# Patient Record
Sex: MALE | Race: WHITE | NOT HISPANIC OR LATINO | URBAN - METROPOLITAN AREA
[De-identification: names, ages, dates, MRNs, and addresses within clinical notes are randomized per-mention and may not be internally consistent; named-entity substitution may affect disease eponyms.]

---

## 2021-05-16 ENCOUNTER — EMERGENCY (EMERGENCY)
Facility: HOSPITAL | Age: 51
LOS: 0 days | Discharge: HOME | End: 2021-05-16
Attending: EMERGENCY MEDICINE | Admitting: EMERGENCY MEDICINE
Payer: OTHER MISCELLANEOUS

## 2021-05-16 VITALS
SYSTOLIC BLOOD PRESSURE: 151 MMHG | RESPIRATION RATE: 18 BRPM | OXYGEN SATURATION: 97 % | DIASTOLIC BLOOD PRESSURE: 83 MMHG | TEMPERATURE: 98 F | HEART RATE: 86 BPM

## 2021-05-16 DIAGNOSIS — M25.562 PAIN IN LEFT KNEE: ICD-10-CM
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
## 2021-05-16 DIAGNOSIS — S39.012A STRAIN OF MUSCLE, FASCIA AND TENDON OF LOWER BACK, INITIAL ENCOUNTER: ICD-10-CM

## 2021-05-16 DIAGNOSIS — S89.92XA UNSPECIFIED INJURY OF LEFT LOWER LEG, INITIAL ENCOUNTER: ICD-10-CM

## 2021-05-16 DIAGNOSIS — M25.511 PAIN IN RIGHT SHOULDER: ICD-10-CM

## 2021-05-16 DIAGNOSIS — S49.91XA UNSPECIFIED INJURY OF RIGHT SHOULDER AND UPPER ARM, INITIAL ENCOUNTER: ICD-10-CM
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
## 2021-05-16 DIAGNOSIS — M54.5 LOW BACK PAIN: ICD-10-CM

## 2021-05-16 PROCEDURE — 73562 X-RAY EXAM OF KNEE 3: CPT | Mod: 26,LT

## 2021-05-16 PROCEDURE — 73030 X-RAY EXAM OF SHOULDER: CPT | Mod: 26,RT

## 2021-05-16 PROCEDURE — 99284 EMERGENCY DEPT VISIT MOD MDM: CPT

## 2021-05-16 RX ORDER — IBUPROFEN 200 MG
600 TABLET ORAL ONCE
Refills: 0 | Status: COMPLETED | OUTPATIENT
Start: 2021-05-16 | End: 2021-05-16

## 2021-05-16 RX ADMIN — Medication 600 MILLIGRAM(S): at 13:35

## 2021-05-16 NOTE — ED PROVIDER NOTE - CLINICAL SUMMARY MEDICAL DECISION MAKING FREE TEXT BOX
xr neg, ace applied to L knee, crutches provided, dc home f/u ortho 1-2 weeks, strict return precautions provided
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
## 2021-05-16 NOTE — ED PROVIDER NOTE - NSFOLLOWUPINSTRUCTIONS_ED_ALL_ED_FT
Back Pain    Back pain is very common in adults. The cause of back pain is rarely dangerous and the pain often gets better over time. The cause of your back pain may not be known and may include strain of muscles or ligaments, degeneration of the spinal disks, or arthritis. Occasionally the pain may radiate down your leg(s). Over-the-counter medicines to reduce pain and inflammation are often the most helpful. Stretching and remaining active frequently helps the healing process.     SEEK IMMEDIATE MEDICAL CARE IF YOU HAVE ANY OF THE FOLLOWING SYMPTOMS: bowel or bladder control problems, unusual weakness or numbness in your arms or legs, nausea or vomiting, abdominal pain, fever, dizziness/lightheadedness.    Knee Pain    WHAT YOU NEED TO KNOW:    What do I need to know about knee pain? Knee pain may start suddenly, or it may be a long-term problem. You may have pain on the side, front, or back of your knee. You may have knee stiffness and swelling. You may hear popping sounds or feel like your knee is giving way or locking up as you walk. You may feel pain when you sit, stand, walk, or climb up and down stairs.    What increases my risk for knee pain?     Obesity      A strain or tear in ligaments or tendons      A leg fracture or knee dislocation      Overuse of your knee      Osteoarthritis, rheumatoid arthritis, or gout      An infection, tumor, or cyst in your knee      Shoes that are not supportive, or training on a hard surface      Sports that involve jumping or pivoting on your knee    How is the cause of knee pain diagnosed? Your healthcare provider will examine your knee and ask about your symptoms. Tell your provider when the pain started and what you were doing at the time. Describe the pain, such as sharp, throbbing, or achy. Tell your provider about any knee injury or surgery you had. You may need any of the following:     MRI, CT, or ultrasound pictures may show an injury, fracture, or tumor.       Blood tests may be used to check the level of inflammation in your blood. The tests may also show signs of infection.      Arthroscopy is a procedure to look inside your knee joint with an arthroscope. An arthroscope is a flexible tube with a light and camera on the end. A knee arthroscopy is usually done to check for disease or damage inside your knee. These problems may be fixed during the procedure.    How is knee pain treated? Treatment will depend on the cause of your pain. You may need any of the following:     NSAIDs help decrease swelling and pain or fever. This medicine is available with or without a doctor's order. NSAIDs can cause stomach bleeding or kidney problems in certain people. If you take blood thinner medicine, always ask your healthcare provider if NSAIDs are safe for you. Always read the medicine label and follow directions.      Acetaminophen decreases pain and fever. It is available without a doctor's order. Ask how much to take and how often to take it. Follow directions. Read the labels of all other medicines you are using to see if they also contain acetaminophen, or ask your doctor or pharmacist. Acetaminophen can cause liver damage if not taken correctly. Do not use more than 4 grams (4,000 milligrams) total of acetaminophen in one day.       Prescription pain medicine may be given. Ask your healthcare provider how to take this medicine safely. Some prescription pain medicines contain acetaminophen. Do not take other medicines that contain acetaminophen without talking to your healthcare provider. Too much acetaminophen may cause liver damage. Prescription pain medicine may cause constipation. Ask your healthcare provider how to prevent or treat constipation.       Steroid injections may be given into your knee. Steroids reduce inflammation and pain.      Surgery may be used for some injuries, such as to repair a torn ACL.    What can I do to manage my symptoms?     Rest your knee so it can heal. Limit activities that increase your pain. Do low-impact exercises, such as walking or swimming.       Apply ice to help reduce swelling and pain. Use an ice pack, or put crushed ice in a plastic bag. Cover it with a towel before you apply it to your knee. Apply ice for 15 to 20 minutes every hour, or as directed.      Apply compression to help reduce swelling. Use a brace or bandage only as directed.      Elevate your knee to help decrease pain and swelling. Elevate your knee while you are sitting or lying down. Prop your leg on pillows to keep your knee above the level of your heart.      Prevent your knee from moving as directed. Your healthcare provider may put on a cast or splint. You may need to wear a leg brace to stabilize your knee. A leg brace can be adjusted to increase your range of motion as your knee heals.Hinged Knee Braces          What can I do to prevent knee pain?     Maintain a healthy weight. Extra weight increases your risk for knee pain. Ask your healthcare provider how much you should weigh. He or she can help you create a safe weight loss plan if you need to lose weight.      Exercise or train properly. Use the correct equipment for sports. Wear shoes that provide good support. Check your posture often as you exercise, play sports, or train for an event. This can help prevent stress and strain on your knees. Rest between sessions so you do not overwork your knees.    When should I seek immediate care?     Your pain is worse, even after treatment.       You cannot bend or straighten your leg completely.       The swelling around your knee does not go down even with treatment.      Your knee is painful and hot to the touch.     When should I contact my healthcare provider?     You have questions or concerns about your condition or care.         CARE AGREEMENT:    You have the right to help plan your care. Learn about your health condition and how it may be treated. Discuss treatment options with your healthcare providers to decide what care you want to receive. You always have the right to refuse treatment.     Shoulder Pain    Many things can cause shoulder pain, including:    An injury to the area.  Overuse of the shoulder.  Arthritis.    The source of the pain can be:    Inflammation.  An injury to the shoulder joint.  An injury to a tendon, ligament, or bone.    HOME CARE INSTRUCTIONS  Take these actions to help with your pain:     Squeeze a soft ball or a foam pad as much as possible. This helps to keep the shoulder from swelling. It also helps to strengthen the arm.  Take over-the-counter and prescription medicines only as told by your health care provider.  If directed, apply ice to the area:  Put ice in a plastic bag.  Place a towel between your skin and the bag.  Leave the ice on for 20 minutes, 2–3 times per day. Stop applying ice if it does not help with the pain.  If you were given a shoulder sling or immobilizer:  Wear it as told.  Remove it to shower or bathe.  Move your arm as little as possible, but keep your hand moving to prevent swelling.    SEEK MEDICAL CARE IF:  Your pain gets worse.  Your pain is not relieved with medicines.  New pain develops in your arm, hand, or fingers.    SEEK IMMEDIATE MEDICAL CARE IF:  Your arm, hand, or fingers:  Tingle.  Become numb.  Become swollen.  Become painful.  Turn white or blue.    ADDITIONAL NOTES AND INSTRUCTIONS    Please follow up with your Primary MD in 24-48 hr.  Seek immediate medical care for any new/worsening signs or symptoms.

## 2021-05-16 NOTE — ED PROVIDER NOTE - CARE PLAN
Principal Discharge DX:	Knee injury, left, initial encounter  Secondary Diagnosis:	Shoulder injury, right, initial encounter  Secondary Diagnosis:	Back strain, initial encounter

## 2021-05-16 NOTE — ED ADULT NURSE NOTE - CAS EDP DISCH TYPE
[Dear  ___] : Dear  [unfilled], [Courtesy Letter:] : I had the pleasure of seeing your patient, [unfilled], in my office today. [Please see my note below.] : Please see my note below. [Referral Closing:] : Thank you very much for seeing this patient.  If you have any questions, please do not hesitate to contact me. [Sincerely,] : Sincerely, [FreeTextEntry3] : .sig Home

## 2021-05-16 NOTE — ED PROVIDER NOTE - PATIENT PORTAL LINK FT
You can access the FollowMyHealth Patient Portal offered by [facility] by registering at the following website: [email or web address]. By joining [facility]’s FollowMyHealth portal, you will also be able to view your health information using other applications (apps) compatible with our system.

## 2023-01-10 PROBLEM — Z78.9 OTHER SPECIFIED HEALTH STATUS: Chronic | Status: ACTIVE | Noted: 2021-05-16

## 2023-01-18 PROBLEM — Z00.00 ENCOUNTER FOR PREVENTIVE HEALTH EXAMINATION: Status: ACTIVE | Noted: 2023-01-18

## 2023-01-30 ENCOUNTER — APPOINTMENT (OUTPATIENT)
Dept: ORTHOPEDIC SURGERY | Facility: CLINIC | Age: 53
End: 2023-01-30

## 2023-02-03 ENCOUNTER — APPOINTMENT (OUTPATIENT)
Dept: ORTHOPEDIC SURGERY | Facility: CLINIC | Age: 53
End: 2023-02-03
Payer: COMMERCIAL

## 2023-02-03 VITALS — HEIGHT: 70 IN

## 2023-02-03 PROCEDURE — 73560 X-RAY EXAM OF KNEE 1 OR 2: CPT | Mod: LT

## 2023-02-03 PROCEDURE — 99203 OFFICE O/P NEW LOW 30 MIN: CPT

## 2023-02-04 NOTE — ASSESSMENT
[FreeTextEntry1] :  at this time patient is retired  [place]  with significant symptoms and  and limits in function\par  relating to his left knee injury and his cervical /lumbar spine\par  based on his orthopedic history, current symptoms,  physical findings and diagnostic test results it is my opinion that he is totally disabled and unable to work we discussed  the possibility in the intermediate future of requiring a left knee replacement that he should maintain his weight light exercise unfortunately could not tolerate anti-inflammatories but we could do some injections\par \par With regard to his cervical lumbar spine if any other diagnostics are  performed to provide a copy I will see him in a few months

## 2023-02-04 NOTE — IMAGING
[de-identified] :  pleasant knees were examined in no distress moderately overweight\par \par Cervical spine good position mild spasm limits in flexion extension rotation\par \par Both shoulders good motion no focal neurologic deficits lumbar spine spasm limited flexion extension some pain with extension to the lower lumbar tightness in dorsal lumbar fascia and hamstrings hip motion is full negative straight leg bilaterally\par \par Left knee limited flexion extension some crepitus mild varus some swelling tenderness on the medial side no atrophy negative Homans sign\par \par X-rays obtained today of the knee show moderate to marked arthritic changes metallic retained hardware consistent with anchors and buttons from his previous ACL repairs\par \par MRI left knee 05/27/2021: Meniscal degeneration status post ACL reconstruction questioning insufficiency tricompartment chondromalacia with early arthritis and Baker cyst\par \par \par X-rays cervical and lumbar spine 01/24/2023: Mild degenerative changes 5 6 and 6 7 in the cervical spine mild degenerative disc disease at L4-5 and L5-S1
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
## 2023-04-05 ENCOUNTER — APPOINTMENT (OUTPATIENT)
Dept: ORTHOPEDIC SURGERY | Facility: CLINIC | Age: 53
End: 2023-04-05
Payer: COMMERCIAL

## 2023-04-05 VITALS — WEIGHT: 215 LBS | HEIGHT: 70 IN | BODY MASS INDEX: 30.78 KG/M2

## 2023-04-05 PROCEDURE — 99213 OFFICE O/P EST LOW 20 MIN: CPT

## 2023-04-05 NOTE — ASSESSMENT
[FreeTextEntry1] :  at this time patient is retired  [place]  with significant symptoms and  and limits in function\par  relating to his left knee injury and his cervical /lumbar spine\par  based on his orthopedic history, current symptoms,  physical findings and diagnostic test results it is my opinion that he is totally disabled and unable to work we discussed  the possibility in the intermediate future of requiring a left knee replacement that he should maintain his weight light exercise unfortunately could not tolerate anti-inflammatories but we could do some injections  in the knee\par \par  he needs to provide a copy  of the nerve I will see him in a few months

## 2023-04-05 NOTE — IMAGING
[de-identified] :  pleasant knees were examined in no distress moderately overweight\par \par Cervical spine good position mild spasm limits in flexion extension rotation\par \par Both shoulders good motion no focal neurologic deficits lumbar spine spasm limited flexion extension some pain with extension to the lower lumbar tightness in dorsal lumbar fascia and hamstrings hip motion is full negative straight leg bilaterally\par \par Left knee limited flexion extension some crepitus mild varus some swelling tenderness on the medial side no atrophy negative Homans sign\par \par X-rays obtained today of the knee show moderate to marked arthritic changes metallic retained hardware consistent with anchors and buttons from his previous ACL repairs\par \par MRI left knee 05/27/2021: Meniscal degeneration status post ACL reconstruction questioning insufficiency tricompartment chondromalacia with early arthritis and Baker cyst\par \par \par X-rays cervical and lumbar spine 01/24/2023: Mild degenerative changes 5 6 and 6 7 in the cervical spine mild degenerative disc disease at L4-5 and L5-S1\par   MRI cervical spine 2/10/23:  Disc herniation 4/5 and 6/7\par  MRI lumbar spine 02/10/2023:  Herniated disc T11-12 multiple disc bulges

## 2023-04-05 NOTE — REASON FOR VISIT
[FreeTextEntry2] : Patient is coming in today for follow up on left knee. patient also describe a lot of pain in the lower back, upper back and neck area. \par  neck and back pain is worse he is having more difficulty sitting is following up with pain management Dr. Panotja did get some MRIs done as well as nerve tests
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
## 2023-07-06 ENCOUNTER — APPOINTMENT (OUTPATIENT)
Dept: ORTHOPEDIC SURGERY | Facility: CLINIC | Age: 53
End: 2023-07-06
Payer: COMMERCIAL

## 2023-07-06 PROCEDURE — 99213 OFFICE O/P EST LOW 20 MIN: CPT
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
## 2023-07-06 NOTE — ASSESSMENT
[FreeTextEntry1] :  at this time patient is retired  [place]  with significant symptoms and  and limits in function\par  relating to his left knee injury and his cervical /lumbar spine\par  based on his orthopedic history, current symptoms,  physical findings and diagnostic test results it is my opinion that he is totally disabled and unable to work we discussed  the possibility in the intermediate future of requiring a left knee replacement \par  he should maintain his weight  and participate in light exercise unfortunately he cannot tolerate anti-inflammatories but we could do some injections  in the knee\par \par  I will see him in a few months

## 2023-07-06 NOTE — IMAGING
[de-identified] :  pleasant knees were examined in no distress moderately overweight\par \par Cervical spine good position mild spasm limits in flexion extension rotation\par \par Both shoulders good motion no focal neurologic deficits lumbar spine spasm limited flexion extension some pain with extension to the lower lumbar tightness in dorsal lumbar fascia and hamstrings hip motion is full negative straight leg bilaterally\par \par Left knee limited flexion extension some crepitus mild varus some swelling tenderness on the medial side no atrophy negative Homans sign\par \par X-rays obtained today of the knee show moderate to marked arthritic changes metallic retained hardware consistent with anchors and buttons from his previous ACL repairs\par \par MRI left knee 05/27/2021: Meniscal degeneration status post ACL reconstruction questioning insufficiency tricompartment chondromalacia with early arthritis and Baker cyst\par \par \par X-rays cervical and lumbar spine 01/24/2023: Mild degenerative changes 5 6 and 6 7 in the cervical spine mild degenerative disc disease at L4-5 and L5-S1\par   MRI cervical spine 2/10/23:  Disc herniation 4/5 and 6/7\par  MRI lumbar spine 02/10/2023:  Herniated disc T11-12 multiple disc bulges\par  EMG upper extremities 02/15/2023:  Bilateral cubital tunnel syndrome\par  EMG lower extremities 02/17/2023:  Bilateral S1 radiculopathy

## 2023-07-06 NOTE — REASON FOR VISIT
[FreeTextEntry2] : left knee Pain   still has fair amount of pain lower back upper back and neck driving more than an hour the pain in the back it is worse with some radiating pain down the legs did provide nerve test for my review did lose 15 lb using Mobic occasionally tizanidine

## 2023-08-23 ENCOUNTER — APPOINTMENT (OUTPATIENT)
Dept: NEUROSURGERY | Facility: CLINIC | Age: 53
End: 2023-08-23
Payer: COMMERCIAL

## 2023-08-23 VITALS — WEIGHT: 210 LBS | HEIGHT: 70 IN | BODY MASS INDEX: 30.06 KG/M2

## 2023-08-23 DIAGNOSIS — Z87.09 PERSONAL HISTORY OF OTHER DISEASES OF THE RESPIRATORY SYSTEM: ICD-10-CM

## 2023-08-23 DIAGNOSIS — Z82.49 FAMILY HISTORY OF ISCHEMIC HEART DISEASE AND OTHER DISEASES OF THE CIRCULATORY SYSTEM: ICD-10-CM

## 2023-08-23 DIAGNOSIS — Z86.79 PERSONAL HISTORY OF OTHER DISEASES OF THE CIRCULATORY SYSTEM: ICD-10-CM

## 2023-08-23 DIAGNOSIS — M47.816 SPONDYLOSIS W/OUT MYELOPATHY OR RADICULOPATHY, LUMBAR REGION: ICD-10-CM

## 2023-08-23 DIAGNOSIS — Z86.69 PERSONAL HISTORY OF OTHER DISEASES OF THE NERVOUS SYSTEM AND SENSE ORGANS: ICD-10-CM

## 2023-08-23 DIAGNOSIS — M47.812 SPONDYLOSIS W/OUT MYELOPATHY OR RADICULOPATHY, CERVICAL REGION: ICD-10-CM

## 2023-08-23 PROCEDURE — 99202 OFFICE O/P NEW SF 15 MIN: CPT

## 2023-08-23 RX ORDER — OMEPRAZOLE 20 MG/1
20 TABLET, DELAYED RELEASE ORAL
Refills: 0 | Status: ACTIVE | COMMUNITY

## 2023-08-23 RX ORDER — FLUTICASONE FUROATE AND VILANTEROL TRIFENATATE 50; 25 UG/1; UG/1
POWDER RESPIRATORY (INHALATION)
Refills: 0 | Status: ACTIVE | COMMUNITY

## 2023-08-23 RX ORDER — ALBUTEROL 90 MCG
90 AEROSOL (GRAM) INHALATION
Refills: 0 | Status: ACTIVE | COMMUNITY

## 2023-08-23 RX ORDER — TIZANIDINE 4 MG/1
4 TABLET ORAL
Refills: 0 | Status: ACTIVE | COMMUNITY

## 2023-08-23 RX ORDER — MELOXICAM 15 MG/1
15 TABLET ORAL
Refills: 0 | Status: ACTIVE | COMMUNITY

## 2023-08-23 RX ORDER — FAMOTIDINE 40 MG/1
40 TABLET, FILM COATED ORAL
Refills: 0 | Status: ACTIVE | COMMUNITY

## 2023-08-24 PROBLEM — M47.816 LUMBAR SPONDYLOSIS: Status: ACTIVE | Noted: 2023-08-24

## 2023-08-24 PROBLEM — M47.812 CERVICAL SPONDYLOSIS: Status: ACTIVE | Noted: 2023-08-24

## 2023-08-24 NOTE — PLAN
[FreeTextEntry1] : Mr. Coppola presents today for evaluation of cervical and lumbar pain. Discussed MRI results; no severe stenosis or cord compression. No neurological deficit noted. I am recommending he continues to do home exercises, as this helps with his neck and back pain. He may also have  cervical TPI/ lumbar MBB/RFA with pain management.  All questions were answered.  He may follow up as needed.
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
## 2023-10-12 ENCOUNTER — APPOINTMENT (OUTPATIENT)
Dept: ORTHOPEDIC SURGERY | Facility: CLINIC | Age: 53
End: 2023-10-12
Payer: COMMERCIAL

## 2023-10-12 PROCEDURE — 99213 OFFICE O/P EST LOW 20 MIN: CPT

## 2023-12-22 ENCOUNTER — APPOINTMENT (OUTPATIENT)
Dept: UROLOGY | Facility: CLINIC | Age: 53
End: 2023-12-22
Payer: COMMERCIAL

## 2023-12-22 VITALS — WEIGHT: 210 LBS | HEIGHT: 70 IN | BODY MASS INDEX: 30.06 KG/M2

## 2023-12-22 DIAGNOSIS — R39.9 UNSPECIFIED SYMPTOMS AND SIGNS INVOLVING THE GENITOURINARY SYSTEM: ICD-10-CM

## 2023-12-22 PROCEDURE — 99204 OFFICE O/P NEW MOD 45 MIN: CPT

## 2023-12-22 RX ORDER — SILDENAFIL 100 MG/1
100 TABLET, FILM COATED ORAL
Qty: 10 | Refills: 3 | Status: ACTIVE | COMMUNITY
Start: 2023-12-22 | End: 1900-01-01

## 2023-12-22 NOTE — PLAN

## 2023-12-22 NOTE — HISTORY OF PRESENT ILLNESS
[FreeTextEntry1] : ISAIAS SANTOYO is a 53 year M w/ a pmhx of HLD which is under control w diet, excerise and meds presents chief complaint of erectile dysfunction. He has difficulty maintaining an erection. He describes a normal to low libido. His sexual dysfunction occurs with both sexual relations. His erections are improved with PDE5 inhibitor - sildenafil 20mg 4-5 tabs. He is  with children, had a vasectomy about ~20 years ago.    The patient denies fevers, chills, nausea and/or vomiting and no unexplained weight loss. In his present occupation he has no known toxin exposure. He does not smoke and drinks socially. He has no known drug allergies. No hx of UTIs, urolithiasis,  tract cancers.   Surgical Hx - Left ACL surgery Fam Hx - No prostate, kidney bladder  Social Hx - retired  of 31years   UA/UCx - negative  Labs Cr 1.0 TG wnl PSA 2.67

## 2023-12-22 NOTE — ASSESSMENT
[FreeTextEntry1] : #PSA screening - PSA 2.64 - will repeat PSA   #ED - renewed Sildenafil 100mg pnr -Patient understands if he experiences an erection lasting more than 4 hours he should report to his nearest ED as this could be a surgical emergency which can lead to permanent erectile dysfunction if not treated promptly  #Hypogonadism - Testosterone, LH, Prolactin, Vitamin D, SHBG, CBC, estradiol - Prior to offering testosterone therapy, hemoglobin and hematocrit should be measured and inform patients regarding the increased risk of polycythemia. Follow up: -We will plan to measure an initial follow-up total testosterone level after an appropriate interval (4 weeks) to ensure that target testosterone levels have been achieved. - We will adjust testosterone therapy dosing to achieve a total testosterone near the normal reference range. - Testosterone levels should be measured every 6-12 months while on testosterone therapy.     Counseling per AUA guidelines: 1.  Low testosterone is a risk factor for cardiovascular disease. 2. Testosterone therapy may result in improvements in erectile function, low sex drive, anemia, bone mineral density, lean body mass, and/or depressive symptoms. 3. Evidence is inconclusive whether testosterone therapy improves cognitive function, measures of diabetes, energy, fatigue, lipid profiles, and quality of life measures. 4. Exogenous testosterone long-term can negatively and permanently impact spermatogenesis and future fertility.  Exogenous testosterone therapy should not be prescribed to men who are currently trying to conceive. 5. There is an absence of evidence linking testosterone therapy to the development of prostate cancer. 6. There is no definitive evidence linking testosterone therapy to a higher incidence of venothrombotic events. 7. At this time, it cannot be stated definitively whether testosterone therapy increases or decreases the risk of cardiovascular events (e.g., myocardial infarction, stroke, cardiovascular-related death, all-cause mortality).

## 2024-01-05 ENCOUNTER — APPOINTMENT (OUTPATIENT)
Dept: UROLOGY | Facility: CLINIC | Age: 54
End: 2024-01-05
Payer: COMMERCIAL

## 2024-01-05 DIAGNOSIS — E55.9 VITAMIN D DEFICIENCY, UNSPECIFIED: ICD-10-CM

## 2024-01-05 DIAGNOSIS — E29.1 TESTICULAR HYPOFUNCTION: ICD-10-CM

## 2024-01-05 DIAGNOSIS — N52.9 MALE ERECTILE DYSFUNCTION, UNSPECIFIED: ICD-10-CM

## 2024-01-05 DIAGNOSIS — Z12.5 ENCOUNTER FOR SCREENING FOR MALIGNANT NEOPLASM OF PROSTATE: ICD-10-CM

## 2024-01-05 PROCEDURE — 99214 OFFICE O/P EST MOD 30 MIN: CPT

## 2024-01-05 NOTE — HISTORY OF PRESENT ILLNESS
[FreeTextEntry1] : ISAIAS SANTOYO is a 53 year M w/ a pmhx of HLD which is under control w diet, exercise and meds presents chief complaint of erectile dysfunction. He has difficulty maintaining an erection. He describes a normal to low libido. His sexual dysfunction occurs with both sexual relations. His erections are improved with PDE5 inhibitor - sildenafil 20mg 4-5 tabs. He is  with children, had a vasectomy about ~20 years ago.  The patient denies fevers, chills, nausea and/or vomiting and no unexplained weight loss. In his present occupation he has no known toxin exposure. He does not smoke and drinks socially. He has no known drug allergies. No hx of UTIs, urolithiasis,  tract cancers.  Surgical Hx - Left ACL surgery Fam Hx - No prostate, kidney bladder Social Hx - retired  of 31years  Office visit 1/5/24 Pt presents today to review labs and reassess his symptoms. Reports that his symptoms have been stable. His Testosterone levels were wnl. (Total T 410). He was noted to have low Vitamin D levels. He has not tried taking Sildenafil since our previous visit as he has authorization issues with his insurance.

## 2024-01-05 NOTE — ASSESSMENT
[FreeTextEntry1] : #PSA screening - PSA 2.64, no high risk factors for PCa - Repeat PSA down to 2.1  #ED - c/w Sildenafil 100mg prn -Patient understands if he experiences an erection lasting more than 4 hours he should report to his nearest ED as this could be a surgical emergency which can lead to permanent erectile dysfunction if not treated promptly  #Hypogonadism - Labs reviewed.  - Testosterone, LH, Prolactin SHBG, CBC, hepatic function, estradiol - wnl - Vitamin D was low, instructed on proper OTC supplementation as studies show low Vit D can contribute to low testosterone levels - RTC in 6 months to reassess symptoms

## 2024-01-05 NOTE — PLAN

## 2024-01-10 ENCOUNTER — APPOINTMENT (OUTPATIENT)
Dept: ORTHOPEDIC SURGERY | Facility: CLINIC | Age: 54
End: 2024-01-10
Payer: COMMERCIAL

## 2024-01-10 PROCEDURE — 99213 OFFICE O/P EST LOW 20 MIN: CPT

## 2024-01-10 NOTE — REASON FOR VISIT
[FreeTextEntry2] : left knee pain  And persistent low back pain on Mobic and tizanidine which help weight stable 205 pounds pain travels into the back of both legs Still sees pain management Dr. Pantoja

## 2024-01-10 NOTE — IMAGING
[de-identified] :  pleasant knees were examined in no distress moderately overweight\par  \par  Cervical spine good position mild spasm limits in flexion extension rotation\par  \par  Both shoulders good motion no focal neurologic deficits lumbar spine spasm limited flexion extension some pain with extension to the lower lumbar tightness in dorsal lumbar fascia and hamstrings hip motion is full negative straight leg bilaterally\par  \par  Left knee limited flexion extension some crepitus mild varus some swelling tenderness on the medial side no atrophy negative Homans sign\par  \par  X-rays obtained today of the knee show moderate to marked arthritic changes metallic retained hardware consistent with anchors and buttons from his previous ACL repairs\par  \par  MRI left knee 05/27/2021: Meniscal degeneration status post ACL reconstruction questioning insufficiency tricompartment chondromalacia with early arthritis and Baker cyst\par  \par  \par  X-rays cervical and lumbar spine 01/24/2023: Mild degenerative changes 5 6 and 6 7 in the cervical spine mild degenerative disc disease at L4-5 and L5-S1\par    MRI cervical spine 2/10/23:  Disc herniation 4/5 and 6/7\par   MRI lumbar spine 02/10/2023:  Herniated disc T11-12 multiple disc bulges\par   EMG upper extremities 02/15/2023:  Bilateral cubital tunnel syndrome\par   EMG lower extremities 02/17/2023:  Bilateral S1 radiculopathy

## 2024-01-10 NOTE — ASSESSMENT
[FreeTextEntry1] :  patient is retired [place]  with significant symptoms and  limits in function relating to his left knee injury and his cervical /lumbar spine   based on his orthopedic history, current symptoms, physical findings and diagnostic test results it is my opinion that he is totally disabled and unable to work we discussed the possibility in the intermediate future of requiring a left knee replacement   he should maintain his weight and participate in light exercise unfortunately he cannot tolerate anti-inflammatories but we could do some injections in the left knee  I will see him in 3 months.

## 2024-01-10 NOTE — HISTORY OF PRESENT ILLNESS
[de-identified] :  53-year-old gentleman with chronic pain in his left knee, cervical and lumbar spine he presents primarily for his left knee.  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 Jj 2021 @ [facility]\par   he is also seeing pain management Dr. Pantoja he did a significant therapy after his most recent operation\par   right handed pain at rest for with activity 5 history of bronchitis  takes for famodtidine and omeprazole cannot take Advil does not smoke no drug allergies\par   pain management  sent him for some MRIs of his spine\par   he has significant difficulty with stair climbing walking distances even on level surfaces is unable to kneel squat crouch or crawl and has limited endurance standing or sitting

## 2024-04-17 ENCOUNTER — APPOINTMENT (OUTPATIENT)
Dept: ORTHOPEDIC SURGERY | Facility: CLINIC | Age: 54
End: 2024-04-17
Payer: COMMERCIAL

## 2024-04-17 VITALS — BODY MASS INDEX: 30.06 KG/M2 | WEIGHT: 210 LBS | HEIGHT: 70 IN

## 2024-04-17 DIAGNOSIS — M54.12 RADICULOPATHY, CERVICAL REGION: ICD-10-CM

## 2024-04-17 DIAGNOSIS — G56.23 LESION OF ULNAR NERVE, BILATERAL UPPER LIMBS: ICD-10-CM

## 2024-04-17 DIAGNOSIS — M17.12 UNILATERAL PRIMARY OSTEOARTHRITIS, LEFT KNEE: ICD-10-CM

## 2024-04-17 DIAGNOSIS — M54.16 RADICULOPATHY, LUMBAR REGION: ICD-10-CM

## 2024-04-17 DIAGNOSIS — M50.90 CERVICAL DISC DISORDER, UNSPECIFIED, UNSPECIFIED CERVICAL REGION: ICD-10-CM

## 2024-04-17 DIAGNOSIS — M51.9 UNSPECIFIED THORACIC, THORACOLUMBAR AND LUMBOSACRAL INTERVERTEBRAL DISC DISORDER: ICD-10-CM

## 2024-04-17 PROCEDURE — 99213 OFFICE O/P EST LOW 20 MIN: CPT

## 2024-04-17 NOTE — ASSESSMENT
[FreeTextEntry1] :  patient is a retired [place]  with significant symptoms and  limited  function relating to his left knee injury and his cervical /lumbar spine   based on his orthopedic history, current symptoms, physical findings and diagnostic test results it is my opinion that he is totally disabled and unable to work we discussed the possibility in the intermediate future of requiring a left knee replacement   he should maintain his weight and participate in light exercise unfortunately he cannot tolerate anti-inflammatories but we could do some injections in the left knee  I will see him in 3 months.

## 2024-04-17 NOTE — IMAGING
[de-identified] :  pleasant knees were examined in no distress moderately overweight\par  \par  Cervical spine good position mild spasm limits in flexion extension rotation\par  \par  Both shoulders good motion no focal neurologic deficits lumbar spine spasm limited flexion extension some pain with extension to the lower lumbar tightness in dorsal lumbar fascia and hamstrings hip motion is full negative straight leg bilaterally\par  \par  Left knee limited flexion extension some crepitus mild varus some swelling tenderness on the medial side no atrophy negative Homans sign\par  \par  X-rays obtained today of the knee show moderate to marked arthritic changes metallic retained hardware consistent with anchors and buttons from his previous ACL repairs\par  \par  MRI left knee 05/27/2021: Meniscal degeneration status post ACL reconstruction questioning insufficiency tricompartment chondromalacia with early arthritis and Baker cyst\par  \par  \par  X-rays cervical and lumbar spine 01/24/2023: Mild degenerative changes 5 6 and 6 7 in the cervical spine mild degenerative disc disease at L4-5 and L5-S1\par    MRI cervical spine 2/10/23:  Disc herniation 4/5 and 6/7\par   MRI lumbar spine 02/10/2023:  Herniated disc T11-12 multiple disc bulges\par   EMG upper extremities 02/15/2023:  Bilateral cubital tunnel syndrome\par   EMG lower extremities 02/17/2023:  Bilateral S1 radiculopathy

## 2024-04-17 NOTE — REASON FOR VISIT
[FreeTextEntry2] : Patient is here for left knee pain and swelling LBP is worse still on tizanidine and mobic weight 210 sees PM

## 2024-04-17 NOTE — HISTORY OF PRESENT ILLNESS
[de-identified] :  53-year-old gentleman with chronic pain in his left knee, cervical and lumbar spine he presents primarily for his left knee.  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 Jj 2021 @ [facility]\par   he is also seeing pain management Dr. Pantoja he did a significant therapy after his most recent operation\par   right handed pain at rest for with activity 5 history of bronchitis  takes for famodtidine and omeprazole cannot take Advil does not smoke no drug allergies\par   pain management  sent him for some MRIs of his spine\par   he has significant difficulty with stair climbing walking distances even on level surfaces is unable to kneel squat crouch or crawl and has limited endurance standing or sitting

## 2024-07-16 RX ORDER — TADALAFIL 20 MG/1
20 TABLET ORAL
Qty: 15 | Refills: 3 | Status: ACTIVE | COMMUNITY
Start: 2024-07-16 | End: 1900-01-01

## 2024-07-24 ENCOUNTER — APPOINTMENT (OUTPATIENT)
Dept: ORTHOPEDIC SURGERY | Facility: CLINIC | Age: 54
End: 2024-07-24
Payer: COMMERCIAL

## 2024-07-24 DIAGNOSIS — M17.12 UNILATERAL PRIMARY OSTEOARTHRITIS, LEFT KNEE: ICD-10-CM

## 2024-07-24 DIAGNOSIS — G56.23 LESION OF ULNAR NERVE, BILATERAL UPPER LIMBS: ICD-10-CM

## 2024-07-24 DIAGNOSIS — M54.16 RADICULOPATHY, LUMBAR REGION: ICD-10-CM

## 2024-07-24 DIAGNOSIS — M51.9 UNSPECIFIED THORACIC, THORACOLUMBAR AND LUMBOSACRAL INTERVERTEBRAL DISC DISORDER: ICD-10-CM

## 2024-07-24 DIAGNOSIS — M50.90 CERVICAL DISC DISORDER, UNSPECIFIED, UNSPECIFIED CERVICAL REGION: ICD-10-CM

## 2024-07-24 DIAGNOSIS — M54.12 RADICULOPATHY, CERVICAL REGION: ICD-10-CM

## 2024-07-24 PROCEDURE — 99213 OFFICE O/P EST LOW 20 MIN: CPT

## 2024-07-24 NOTE — IMAGING
[de-identified] :  pleasant knees were examined in no distress moderately overweight\par  \par  Cervical spine good position mild spasm limits in flexion extension rotation\par  \par  Both shoulders good motion no focal neurologic deficits lumbar spine spasm limited flexion extension some pain with extension to the lower lumbar tightness in dorsal lumbar fascia and hamstrings hip motion is full negative straight leg bilaterally\par  \par  Left knee limited flexion extension some crepitus mild varus some swelling tenderness on the medial side no atrophy negative Homans sign\par  \par  X-rays obtained today of the knee show moderate to marked arthritic changes metallic retained hardware consistent with anchors and buttons from his previous ACL repairs\par  \par  MRI left knee 05/27/2021: Meniscal degeneration status post ACL reconstruction questioning insufficiency tricompartment chondromalacia with early arthritis and Baker cyst\par  \par  \par  X-rays cervical and lumbar spine 01/24/2023: Mild degenerative changes 5 6 and 6 7 in the cervical spine mild degenerative disc disease at L4-5 and L5-S1\par    MRI cervical spine 2/10/23:  Disc herniation 4/5 and 6/7\par   MRI lumbar spine 02/10/2023:  Herniated disc T11-12 multiple disc bulges\par   EMG upper extremities 02/15/2023:  Bilateral cubital tunnel syndrome\par   EMG lower extremities 02/17/2023:  Bilateral S1 radiculopathy

## 2024-07-24 NOTE — ASSESSMENT
[FreeTextEntry1] :  patient is a retired [place]  with significant symptoms and  limited  function relating to his left knee injury and his cervical /lumbar spine   based on his orthopedic history, current symptoms, physical findings and diagnostic test results it remains my opinion that he is totally disabled and unable to work we discussed the possibility in the intermediate future of requiring a left knee replacement   he should maintain his weight and participate in light exercise unfortunately he cannot tolerate anti-inflammatories but we could do some injections in the left knee  I will see him in 3 months.

## 2024-07-24 NOTE — HISTORY OF PRESENT ILLNESS
[de-identified] :  53-year-old gentleman with chronic pain in his left knee, cervical and lumbar spine he presents primarily for his left knee.  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 Jj 2021 @ [facility]\par   he is also seeing pain management Dr. Pantoja he did a significant therapy after his most recent operation\par   right handed pain at rest for with activity 5 history of bronchitis  takes for famodtidine and omeprazole cannot take Advil does not smoke no drug allergies\par   pain management  sent him for some MRIs of his spine\par   he has significant difficulty with stair climbing walking distances even on level surfaces is unable to kneel squat crouch or crawl and has limited endurance standing or sitting

## 2024-07-24 NOTE — REASON FOR VISIT
[FreeTextEntry2] : Patient is here for left knee pain still seeing Dr. Pantoja neck and back stiff also some left knee stiffness remains on Mobic regularly tizanidine and tramadol occasionally weight stable

## 2024-09-12 ENCOUNTER — NON-APPOINTMENT (OUTPATIENT)
Age: 54
End: 2024-09-12

## 2024-09-13 ENCOUNTER — APPOINTMENT (OUTPATIENT)
Dept: UROLOGY | Facility: CLINIC | Age: 54
End: 2024-09-13
Payer: COMMERCIAL

## 2024-09-13 DIAGNOSIS — Z12.5 ENCOUNTER FOR SCREENING FOR MALIGNANT NEOPLASM OF PROSTATE: ICD-10-CM

## 2024-09-13 DIAGNOSIS — R39.9 UNSPECIFIED SYMPTOMS AND SIGNS INVOLVING THE GENITOURINARY SYSTEM: ICD-10-CM

## 2024-09-13 DIAGNOSIS — E55.9 VITAMIN D DEFICIENCY, UNSPECIFIED: ICD-10-CM

## 2024-09-13 DIAGNOSIS — N52.9 MALE ERECTILE DYSFUNCTION, UNSPECIFIED: ICD-10-CM

## 2024-09-13 PROCEDURE — 99214 OFFICE O/P EST MOD 30 MIN: CPT

## 2024-09-13 PROCEDURE — G2211 COMPLEX E/M VISIT ADD ON: CPT | Mod: NC

## 2024-09-13 RX ORDER — TADALAFIL 5 MG/1
5 TABLET ORAL
Qty: 90 | Refills: 3 | Status: ACTIVE | COMMUNITY
Start: 2024-09-13 | End: 1900-01-01

## 2024-09-13 NOTE — HISTORY OF PRESENT ILLNESS
[FreeTextEntry1] : ISAIAS SANTOYO is a 53 year M w/ a pmhx of HLD which is under control w diet, exercise and meds presents chief complaint of erectile dysfunction. He has difficulty maintaining an erection. He describes a normal to low libido. His sexual dysfunction occurs with both sexual relations. His erections are improved with PDE5 inhibitor - sildenafil 20mg 4-5 tabs. He is  with children, had a vasectomy about ~20 years ago. He does not smoke and drinks socially. He has no known drug allergies. No hx of UTIs, urolithiasis,  tract cancers.  Surgical Hx - Left ACL surgery Fam Hx - No prostate, kidney bladder Social Hx - retired  of 31years  Office visit 1/5/24 Pt presents today to review labs and reassess his symptoms. Reports that his symptoms have been stable. His Testosterone levels were wnl. (Total T 410). He was noted to have low Vitamin D levels. He has not tried taking Sildenafil since our previous visit as he has authorization issues with his insurance.  Office Visit 09/13/2024  Pt reports no new complaints.  He has been taking Cialis 20 mg as needed and is very satisfied with the quality of his erections.  He is also interested in starting 5 mg daily of Cialis to have been added boost also to help with his LUTS.  The patient denies having any fevers, chills, nausea, vomiting, flank/abdominal pain or any irritative voiding symptoms.

## 2024-09-13 NOTE — PLAN

## 2024-09-13 NOTE — PLAN

## 2024-09-13 NOTE — ASSESSMENT
[FreeTextEntry1] : #PSA screening - no high risk factors for PCa - PSA 2.1 (12/2023) - PSA 2.0 (9/2024) - Repeat PSA in 1 year Today I discussed the risks and benefits of PSA screening. There are a number of benign conditions including UTI, BPH, coyle catheter, certain activities and prostatitis that will increase PSA in the absence of cancer. Unfortunately, the only way to definitively diagnose cancer is with a prostate biopsy. I discussed the method of performing biopsy (transperineal with anesthesia) and the risks (bleeding, infection, urinary retention, ED). In addition to this, the patient and I discussed the discrepancy between the prevalence of prostate cancer and the prevalence of death from prostate cancer.  Prostate cancer is the most common solid tumor in American men. However, I mentioned how it can be very slow growing, many men with prostate cancer will die with the disease rather than from it.  #ED - c/w Cialis 20mg PRN - start Cialis 5mg daily, patient understands not to take 20 mg of Cialis on the same day he takes 5 mg. -Patient understands if he experiences an erection lasting more than 4 hours he should report to his nearest ED as this could be a surgical emergency which can lead to permanent erectile dysfunction if not treated promptly  #Hypogonadal symptoms - Labs reviewed.  - 12/2023 - Testosterone, LH, Prolactin SHBG, CBC, hepatic function, estradiol - wnl - RTC in 12 months to reassess symptoms.  #Vitamin D insufficiency - Vitamin D was low, instructed on proper OTC supplementation as studies show low Vit D can contribute to low testosterone levels Discussed with patient how vitamin D production plays an role in testosterone production and how its deficiency has been linked to some men with hypogonadism. Patient should supplement with 600-1000IU daily, recommended doses vary widely between medical societies and depend on several patient factors. Patient should also increase safe sun exposure, and fortify his diet with fatty fish, fortified dairy products, and fortified cereals.

## 2024-10-24 ENCOUNTER — APPOINTMENT (OUTPATIENT)
Dept: ORTHOPEDIC SURGERY | Facility: CLINIC | Age: 54
End: 2024-10-24
Payer: COMMERCIAL

## 2024-10-24 DIAGNOSIS — M54.12 RADICULOPATHY, CERVICAL REGION: ICD-10-CM

## 2024-10-24 DIAGNOSIS — M50.90 CERVICAL DISC DISORDER, UNSPECIFIED, UNSPECIFIED CERVICAL REGION: ICD-10-CM

## 2024-10-24 DIAGNOSIS — M17.12 UNILATERAL PRIMARY OSTEOARTHRITIS, LEFT KNEE: ICD-10-CM

## 2024-10-24 DIAGNOSIS — M54.16 RADICULOPATHY, LUMBAR REGION: ICD-10-CM

## 2024-10-24 DIAGNOSIS — G56.23 LESION OF ULNAR NERVE, BILATERAL UPPER LIMBS: ICD-10-CM

## 2024-10-24 DIAGNOSIS — M51.9 UNSPECIFIED THORACIC, THORACOLUMBAR AND LUMBOSACRAL INTERVERTEBRAL DISC DISORDER: ICD-10-CM

## 2024-10-24 PROCEDURE — 99213 OFFICE O/P EST LOW 20 MIN: CPT

## 2025-01-16 ENCOUNTER — APPOINTMENT (OUTPATIENT)
Dept: ORTHOPEDIC SURGERY | Facility: CLINIC | Age: 55
End: 2025-01-16
Payer: COMMERCIAL

## 2025-01-16 DIAGNOSIS — M50.90 CERVICAL DISC DISORDER, UNSPECIFIED, UNSPECIFIED CERVICAL REGION: ICD-10-CM

## 2025-01-16 DIAGNOSIS — M54.12 RADICULOPATHY, CERVICAL REGION: ICD-10-CM

## 2025-01-16 DIAGNOSIS — M54.16 RADICULOPATHY, LUMBAR REGION: ICD-10-CM

## 2025-01-16 DIAGNOSIS — M17.12 UNILATERAL PRIMARY OSTEOARTHRITIS, LEFT KNEE: ICD-10-CM

## 2025-01-16 DIAGNOSIS — M51.9 UNSPECIFIED THORACIC, THORACOLUMBAR AND LUMBOSACRAL INTERVERTEBRAL DISC DISORDER: ICD-10-CM

## 2025-01-16 DIAGNOSIS — G56.23 LESION OF ULNAR NERVE, BILATERAL UPPER LIMBS: ICD-10-CM

## 2025-01-16 PROCEDURE — 99213 OFFICE O/P EST LOW 20 MIN: CPT

## 2025-04-16 ENCOUNTER — APPOINTMENT (OUTPATIENT)
Dept: ORTHOPEDIC SURGERY | Facility: CLINIC | Age: 55
End: 2025-04-16

## 2025-06-16 ENCOUNTER — APPOINTMENT (OUTPATIENT)
Dept: ORTHOPEDIC SURGERY | Facility: CLINIC | Age: 55
End: 2025-06-16
Payer: COMMERCIAL

## 2025-06-16 PROBLEM — M18.0 PRIMARY OSTEOARTHRITIS OF BOTH FIRST CARPOMETACARPAL JOINTS: Status: ACTIVE | Noted: 2025-06-16

## 2025-06-16 PROBLEM — M67.432 GANGLION OF LEFT WRIST: Status: ACTIVE | Noted: 2025-06-16

## 2025-06-16 PROCEDURE — 99213 OFFICE O/P EST LOW 20 MIN: CPT

## 2025-06-16 PROCEDURE — 73130 X-RAY EXAM OF HAND: CPT | Mod: 50

## 2025-09-18 ENCOUNTER — APPOINTMENT (OUTPATIENT)
Dept: UROLOGY | Facility: CLINIC | Age: 55
End: 2025-09-18
Payer: COMMERCIAL

## 2025-09-18 DIAGNOSIS — R39.9 UNSPECIFIED SYMPTOMS AND SIGNS INVOLVING THE GENITOURINARY SYSTEM: ICD-10-CM

## 2025-09-18 DIAGNOSIS — Z12.5 ENCOUNTER FOR SCREENING FOR MALIGNANT NEOPLASM OF PROSTATE: ICD-10-CM

## 2025-09-18 PROCEDURE — 99213 OFFICE O/P EST LOW 20 MIN: CPT

## 2025-09-18 PROCEDURE — G2211 COMPLEX E/M VISIT ADD ON: CPT | Mod: NC
